# Patient Record
Sex: FEMALE | Race: WHITE | NOT HISPANIC OR LATINO | Employment: UNEMPLOYED | URBAN - METROPOLITAN AREA
[De-identification: names, ages, dates, MRNs, and addresses within clinical notes are randomized per-mention and may not be internally consistent; named-entity substitution may affect disease eponyms.]

---

## 2023-05-06 LAB — GROUP B STREP SCREEN: NORMAL

## 2023-05-20 ENCOUNTER — HOSPITAL ENCOUNTER (OUTPATIENT)
Dept: DATA CONVERSION | Facility: HOSPITAL | Age: 23
End: 2023-05-20
Attending: OBSTETRICS & GYNECOLOGY

## 2023-05-20 DIAGNOSIS — R30.0 DYSURIA: ICD-10-CM

## 2023-05-20 DIAGNOSIS — Z79.899 OTHER LONG TERM (CURRENT) DRUG THERAPY: ICD-10-CM

## 2023-05-20 DIAGNOSIS — F32.A DEPRESSION, UNSPECIFIED: ICD-10-CM

## 2023-05-20 DIAGNOSIS — R10.30 LOWER ABDOMINAL PAIN, UNSPECIFIED: ICD-10-CM

## 2023-05-20 LAB
APPEARANCE, URINE: CLEAR
BACTERIA, URINE: ABNORMAL /HPF
BILIRUBIN, URINE: NEGATIVE
BLOOD, URINE: ABNORMAL
COLOR, URINE: YELLOW
GLUCOSE, URINE: NEGATIVE MG/DL
KETONES, URINE: NEGATIVE MG/DL
LEUKOCYTE ESTERASE, URINE: ABNORMAL
MUCUS, URINE: ABNORMAL /LPF
NITRITE, URINE: NEGATIVE
PH, URINE: 7 (ref 5–8)
PROTEIN, URINE: NEGATIVE MG/DL
RBC, URINE: 14 /HPF (ref 0–5)
SPECIFIC GRAVITY, URINE: 1.02 (ref 1–1.03)
SQUAMOUS EPITHELIAL CELLS, URINE: 1 /HPF
UROBILINOGEN, URINE: <2 MG/DL (ref 0–1.9)
WBC, URINE: 3 /HPF (ref 0–5)

## 2023-05-22 LAB — URINE CULTURE: ABNORMAL

## 2023-09-07 VITALS — BODY MASS INDEX: 25.25 KG/M2 | WEIGHT: 170.86 LBS

## 2023-09-30 NOTE — PROGRESS NOTES
Current Stage:   Stage: Post-partum     Subjective Data:   Post Partum:  Ambulate: Yes   Flatus: Yes   Tolerate Diet: Yes   Lochia: Light   Current Pain Level (1-10): 3   Postpartum:    21 yo     s/p     4  days   ago   no   issues,  dev  low  abd  pain x 1  day  continuous  mod/severe no  fever   pos   chills  pos    dysuria aller   amox     pcn   ,   hives   meds  prozac  every day  pmh   depression   psh  neg   smoke  etoh  drugs   neg         Objective Information:    Objective Information:      T   P  R  BP   MAP  SpO2   Value     72     116/62   82     Date/Time    15:41    15:41   15:41    Range     (72 - 72 )    (116 - 116 )/ (62 - 62 )  (82 - 82 )        Physical Exam:   Constitutional: alert, oriented   Obstetric: uterus 12  weeks   n/t   fundus   tender  suprapubic  no   cvat   Respiratory/Thorax: normal respiratory effort, lungs  clear, no wheezes or rhonchi   Cardiovascular: S1 S2 RRR, no murmurs   Gastrointestinal: abd   soft    n/t   pos    s/p    tenderness   Genitourinary: no  cvat   Extremities: no calf tenderness, reflexes 2+   Psychological: appropriate affect     Assessment and Plan:   Assessment:    4  days   s/p       with  s/p  tenderness r/o   uti   check  u/a   if   pos    home   with   abxs  f/u  office   if  not better  this  week     Plan of Care Reviewed With:  Plan of Care Reviewed With: patient; spouse       Electronic Signatures:  Nolberto Pendleton)  (Signed 20-May-2023 16:15)   Authored: Current Stage, Subjective Data, Objective Data,  Assessment and Plan, Note Completion      Last Updated: 20-May-2023 16:15 by Nolberto Pendleton)

## 2023-11-28 ENCOUNTER — OFFICE VISIT (OUTPATIENT)
Dept: PRIMARY CARE | Facility: CLINIC | Age: 23
End: 2023-11-28
Payer: COMMERCIAL

## 2023-11-28 VITALS
DIASTOLIC BLOOD PRESSURE: 70 MMHG | HEART RATE: 87 BPM | OXYGEN SATURATION: 98 % | BODY MASS INDEX: 26.75 KG/M2 | WEIGHT: 181 LBS | SYSTOLIC BLOOD PRESSURE: 110 MMHG

## 2023-11-28 DIAGNOSIS — F41.9 ANXIETY AND DEPRESSION: Primary | ICD-10-CM

## 2023-11-28 DIAGNOSIS — F32.A ANXIETY AND DEPRESSION: Primary | ICD-10-CM

## 2023-11-28 PROCEDURE — 1036F TOBACCO NON-USER: CPT

## 2023-11-28 PROCEDURE — 99213 OFFICE O/P EST LOW 20 MIN: CPT

## 2023-11-28 RX ORDER — VENLAFAXINE HYDROCHLORIDE 37.5 MG/1
37.5 TABLET, EXTENDED RELEASE ORAL DAILY
Qty: 30 TABLET | Refills: 1 | Status: SHIPPED | OUTPATIENT
Start: 2023-11-28 | End: 2023-12-18 | Stop reason: DRUGHIGH

## 2023-11-28 RX ORDER — FLUOXETINE HYDROCHLORIDE 60 MG/1
60 TABLET, FILM COATED ORAL; ORAL DAILY
COMMUNITY
Start: 2023-11-03 | End: 2023-11-28 | Stop reason: DRUGHIGH

## 2023-11-28 ASSESSMENT — ANXIETY QUESTIONNAIRES
IF YOU CHECKED OFF ANY PROBLEMS ON THIS QUESTIONNAIRE, HOW DIFFICULT HAVE THESE PROBLEMS MADE IT FOR YOU TO DO YOUR WORK, TAKE CARE OF THINGS AT HOME, OR GET ALONG WITH OTHER PEOPLE: SOMEWHAT DIFFICULT
1. FEELING NERVOUS, ANXIOUS, OR ON EDGE: NEARLY EVERY DAY
GAD7 TOTAL SCORE: 21
6. BECOMING EASILY ANNOYED OR IRRITABLE: NEARLY EVERY DAY
3. WORRYING TOO MUCH ABOUT DIFFERENT THINGS: NEARLY EVERY DAY
5. BEING SO RESTLESS THAT IT IS HARD TO SIT STILL: NEARLY EVERY DAY
4. TROUBLE RELAXING: NEARLY EVERY DAY
2. NOT BEING ABLE TO STOP OR CONTROL WORRYING: NEARLY EVERY DAY
7. FEELING AFRAID AS IF SOMETHING AWFUL MIGHT HAPPEN: NEARLY EVERY DAY

## 2023-11-28 ASSESSMENT — PATIENT HEALTH QUESTIONNAIRE - PHQ9
10. IF YOU CHECKED OFF ANY PROBLEMS, HOW DIFFICULT HAVE THESE PROBLEMS MADE IT FOR YOU TO DO YOUR WORK, TAKE CARE OF THINGS AT HOME, OR GET ALONG WITH OTHER PEOPLE: SOMEWHAT DIFFICULT
3. TROUBLE FALLING OR STAYING ASLEEP OR SLEEPING TOO MUCH: NEARLY EVERY DAY
5. POOR APPETITE OR OVEREATING: NEARLY EVERY DAY
9. THOUGHTS THAT YOU WOULD BE BETTER OFF DEAD, OR OF HURTING YOURSELF: NOT AT ALL
SUM OF ALL RESPONSES TO PHQ9 QUESTIONS 1 AND 2: 3
2. FEELING DOWN, DEPRESSED OR HOPELESS: SEVERAL DAYS
8. MOVING OR SPEAKING SO SLOWLY THAT OTHER PEOPLE COULD HAVE NOTICED. OR THE OPPOSITE, BEING SO FIGETY OR RESTLESS THAT YOU HAVE BEEN MOVING AROUND A LOT MORE THAN USUAL: SEVERAL DAYS
SUM OF ALL RESPONSES TO PHQ QUESTIONS 1-9: 15
7. TROUBLE CONCENTRATING ON THINGS, SUCH AS READING THE NEWSPAPER OR WATCHING TELEVISION: NOT AT ALL
4. FEELING TIRED OR HAVING LITTLE ENERGY: NEARLY EVERY DAY
6. FEELING BAD ABOUT YOURSELF - OR THAT YOU ARE A FAILURE OR HAVE LET YOURSELF OR YOUR FAMILY DOWN: MORE THAN HALF THE DAYS
1. LITTLE INTEREST OR PLEASURE IN DOING THINGS: MORE THAN HALF THE DAYS

## 2023-11-28 NOTE — PROGRESS NOTES
Subjective   Patient ID: Ruth M Detweiler is a 23 y.o. female who presents for Anxiety.  HPI  Amber presents with her  for anxiety.  She states that she has no energy to do anything during the day.  Sometimes when her anxiety is at its worst she feels like she cannot breathe. She states that she takes a deep breath then and feels better, it goes away.  She states that being alone makes her anxiety worse, being around people helps the most.   She states that her depression and anxiety started after her son was born in May, maybe in July is when it started.   Dr. Giles put her on fluoxetine 40mg and she then increased to 60mg, been on it for 6 months. She states that it was working well at first but now it she does not feel it helping the last month. No side effects she has noticed. She takes it every day.   Especially in the last 2 weeks, she has felt like her anxiety is worse. She denies increase in stress or changes. She did loose both grandparents a couple months ago which has been hard on her.   Sleep is not great, brain runs all night with thoughts.   Decreased appetite.   She states that she feels like she is down more easily.  She has moments where she feels tearful and doesn't know why.   No thoughts to hurt herself or anyone else.   She is not breast feeding and is not planning on having any baby soon.   No heart racing, no chest pain.    Has had anxiety before when she was younger. She has never been on meds before until fluoxetine.   PHQ 9 - 15  NEZO-7 - 21     No past surgical history on file.   Past Medical History:   Diagnosis Date    Personal history of diseases of the skin and subcutaneous tissue 09/29/2017    History of impetigo    Personal history of other diseases of the respiratory system 11/10/2020    History of acute sinusitis    Right lower quadrant pain 09/29/2017    Abdominal pain, RLQ (right lower quadrant)     Social History     Tobacco Use    Smoking status: Never    Smokeless  tobacco: Never   Substance Use Topics    Alcohol use: Never    Drug use: Never        Review of Systems  10 point review of systems performed and is negative except as noted in the HPI.      Current Outpatient Medications:     venlafaxine 37.5 mg 24 hr tablet, Take 1 tablet (37.5 mg) by mouth once daily. For 4 days. Then take 2 pills a day for 2 weeks. Call at 2 weeks if this is working well for you., Disp: 30 tablet, Rfl: 1     Objective   /70   Pulse 87   Wt 82.1 kg (181 lb)   SpO2 98%   BMI 26.75 kg/m²     Physical Exam  Constitutional:       General: She is not in acute distress.     Appearance: Normal appearance. She is not ill-appearing.   HENT:      Head: Normocephalic and atraumatic.   Eyes:      Conjunctiva/sclera: Conjunctivae normal.      Pupils: Pupils are equal, round, and reactive to light.   Neurological:      Mental Status: She is alert and oriented to person, place, and time.   Psychiatric:         Attention and Perception: Attention and perception normal.         Mood and Affect: Affect normal. Mood is anxious. Mood is not depressed. Affect is not labile, flat or tearful.         Speech: Speech normal.         Behavior: Behavior normal. Behavior is cooperative.         Thought Content: Thought content normal. Thought content does not include homicidal or suicidal ideation.         Judgment: Judgment normal.         Assessment/Plan   Problem List Items Addressed This Visit    None  Visit Diagnoses       Anxiety and depression    -  Primary    Relevant Medications    venlafaxine 37.5 mg 24 hr tablet        Anxiety and depression   PHQ 9 - 15  ENZO-7 - 21   Discussed several different options - either increasing fluoxetine to 80mg, or adding on wellbutrin but discussion that it could worsen anxiety, or stopping fluoxetine and starting venlafaxine   Patient would like to try venlafaxine - instructions given on how to taper to start   Recommend follow up in 2 weeks to discuss how she is doing  and to continue to adjust taper - patient to call the office     Discussed at visit any disease processes that were of concern as well as the risks, benefits and instructions on any new medication provided. Patient (and/or caretaker of patient if present) stated all questions were answered, and they voiced understanding of instructions.

## 2023-11-28 NOTE — PATIENT INSTRUCTIONS
Stop fluoxetine.   Start Venlafaxine - 37.5 mg for 4 days then can take 2 pills a day for a total of 75 mg - try this for 2 weeks and see how you feel. Call the office and I can send in the 75mg only (that way you take 1 pill) or we can bump up then.   Call with questions.

## 2023-12-18 ENCOUNTER — TELEPHONE (OUTPATIENT)
Dept: PRIMARY CARE | Facility: CLINIC | Age: 23
End: 2023-12-18
Payer: COMMERCIAL

## 2023-12-18 DIAGNOSIS — F32.A ANXIETY AND DEPRESSION: ICD-10-CM

## 2023-12-18 DIAGNOSIS — F41.9 ANXIETY AND DEPRESSION: ICD-10-CM

## 2023-12-18 RX ORDER — VENLAFAXINE HYDROCHLORIDE 75 MG/1
75 CAPSULE, EXTENDED RELEASE ORAL DAILY
Qty: 90 CAPSULE | Refills: 1 | Status: SHIPPED | OUTPATIENT
Start: 2023-12-18 | End: 2024-06-15

## 2024-06-12 ENCOUNTER — APPOINTMENT (OUTPATIENT)
Dept: PRIMARY CARE | Facility: CLINIC | Age: 24
End: 2024-06-12
Payer: COMMERCIAL

## 2024-06-21 ENCOUNTER — APPOINTMENT (OUTPATIENT)
Dept: PRIMARY CARE | Facility: CLINIC | Age: 24
End: 2024-06-21
Payer: COMMERCIAL

## 2024-06-21 VITALS
OXYGEN SATURATION: 98 % | HEART RATE: 87 BPM | DIASTOLIC BLOOD PRESSURE: 64 MMHG | BODY MASS INDEX: 30.46 KG/M2 | HEIGHT: 68 IN | WEIGHT: 201 LBS | SYSTOLIC BLOOD PRESSURE: 108 MMHG

## 2024-06-21 DIAGNOSIS — F41.9 ANXIETY AND DEPRESSION: ICD-10-CM

## 2024-06-21 DIAGNOSIS — F32.A ANXIETY AND DEPRESSION: ICD-10-CM

## 2024-06-21 PROCEDURE — 1036F TOBACCO NON-USER: CPT

## 2024-06-21 PROCEDURE — 99213 OFFICE O/P EST LOW 20 MIN: CPT

## 2024-06-21 RX ORDER — VENLAFAXINE HYDROCHLORIDE 75 MG/1
75 CAPSULE, EXTENDED RELEASE ORAL DAILY
Qty: 90 CAPSULE | Refills: 3 | Status: SHIPPED | OUTPATIENT
Start: 2024-06-21 | End: 2025-06-16

## 2024-06-21 NOTE — PROGRESS NOTES
"Subjective   Patient ID: Amber Logan is a 23 y.o. female who presents for Annual Exam (Medication refills ).  HPI  Amber presents for refill of venlafaxine 75mg   Medication is working well   She is happy on it   Controls mood and worrying well   Denies low thoughts   No side effects     Sleep - has 2 kids so sleep can be variable but otherwise sleep is good   Diet - normal, appetite is fine     LMP - 2 weeks ago, monthly, no issues     No past surgical history on file.   Past Medical History:   Diagnosis Date    Personal history of diseases of the skin and subcutaneous tissue 09/29/2017    History of impetigo    Personal history of other diseases of the respiratory system 11/10/2020    History of acute sinusitis    Right lower quadrant pain 09/29/2017    Abdominal pain, RLQ (right lower quadrant)     Social History     Tobacco Use    Smoking status: Never    Smokeless tobacco: Never   Substance Use Topics    Alcohol use: Never    Drug use: Never        Review of Systems  10 point review of systems performed and is negative except as noted in the HPI.      Current Outpatient Medications:     venlafaxine XR (Effexor-XR) 75 mg 24 hr capsule, Take 1 capsule (75 mg) by mouth once daily. Take with food., Disp: 90 capsule, Rfl: 3     Objective   /64   Pulse 87   Ht 1.727 m (5' 8\")   Wt 91.2 kg (201 lb)   SpO2 98%   BMI 30.56 kg/m²     Physical Exam  Constitutional:       General: She is not in acute distress.     Appearance: Normal appearance. She is not ill-appearing or toxic-appearing.   HENT:      Head: Normocephalic and atraumatic.      Right Ear: Tympanic membrane, ear canal and external ear normal.      Left Ear: Tympanic membrane, ear canal and external ear normal.      Nose: Nose normal. No congestion or rhinorrhea.      Mouth/Throat:      Mouth: Mucous membranes are moist.      Pharynx: Oropharynx is clear. No oropharyngeal exudate or posterior oropharyngeal erythema.   Eyes:      Conjunctiva/sclera: " Conjunctivae normal.      Pupils: Pupils are equal, round, and reactive to light.   Cardiovascular:      Rate and Rhythm: Normal rate and regular rhythm.      Pulses: Normal pulses.      Heart sounds: Normal heart sounds. No murmur heard.  Pulmonary:      Effort: Pulmonary effort is normal.      Breath sounds: Normal breath sounds. No wheezing, rhonchi or rales.   Abdominal:      General: Bowel sounds are normal. There is no distension.      Palpations: Abdomen is soft. There is no mass.      Tenderness: There is no abdominal tenderness. There is no guarding or rebound.   Musculoskeletal:         General: Normal range of motion.      Cervical back: Normal range of motion and neck supple. No tenderness.      Right lower leg: No edema.      Left lower leg: No edema.   Lymphadenopathy:      Cervical: No cervical adenopathy.   Skin:     General: Skin is warm and dry.   Neurological:      Mental Status: She is alert and oriented to person, place, and time.   Psychiatric:         Attention and Perception: Attention and perception normal.         Mood and Affect: Mood and affect normal.         Speech: Speech normal.         Behavior: Behavior normal. Behavior is cooperative.         Thought Content: Thought content normal. Thought content does not include homicidal or suicidal ideation.         Judgment: Judgment normal.         Assessment/Plan   Problem List Items Addressed This Visit       Anxiety and depression    Relevant Medications    venlafaxine XR (Effexor-XR) 75 mg 24 hr capsule     Anxiety and depression   Well controled on venlafaxine 75mg   Had failed fluoxetine in the past   Follow up in 1 year or sooner as needed       Discussed at visit any disease processes that were of concern as well as the risks, benefits and instructions on any new medication provided. Patient (and/or caretaker of patient if present) stated all questions were answered, and they voiced understanding of instructions.